# Patient Record
Sex: FEMALE | Race: WHITE | URBAN - METROPOLITAN AREA
[De-identification: names, ages, dates, MRNs, and addresses within clinical notes are randomized per-mention and may not be internally consistent; named-entity substitution may affect disease eponyms.]

---

## 2020-11-04 ENCOUNTER — VIRTUAL VISIT (OUTPATIENT)
Dept: FAMILY MEDICINE | Facility: CLINIC | Age: 70
End: 2020-11-04
Payer: COMMERCIAL

## 2020-11-04 DIAGNOSIS — B37.31 CANDIDIASIS OF VAGINA: ICD-10-CM

## 2020-11-04 DIAGNOSIS — J01.90 ACUTE SINUSITIS WITH SYMPTOMS > 10 DAYS: Primary | ICD-10-CM

## 2020-11-04 PROCEDURE — 99203 OFFICE O/P NEW LOW 30 MIN: CPT | Mod: 95 | Performed by: NURSE PRACTITIONER

## 2020-11-04 RX ORDER — CITALOPRAM HYDROBROMIDE 10 MG/1
10 TABLET ORAL DAILY
COMMUNITY
Start: 2020-09-29

## 2020-11-04 RX ORDER — LOSARTAN POTASSIUM AND HYDROCHLOROTHIAZIDE 12.5; 1 MG/1; MG/1
TABLET ORAL
COMMUNITY
Start: 2020-10-15

## 2020-11-04 RX ORDER — FLUCONAZOLE 150 MG/1
150 TABLET ORAL ONCE
Qty: 1 TABLET | Refills: 0 | Status: SHIPPED | OUTPATIENT
Start: 2020-11-04 | End: 2020-11-04

## 2020-11-04 ASSESSMENT — ENCOUNTER SYMPTOMS
COUGH: 0
NAUSEA: 0
DIARRHEA: 0
HEADACHES: 1
SINUS PRESSURE: 1
FATIGUE: 1
VOMITING: 0
SORE THROAT: 0
RHINORRHEA: 1
FEVER: 0
CHILLS: 1

## 2020-11-04 NOTE — PATIENT INSTRUCTIONS
You have a sinus infection.  1. Take antibiotics as prescribed for the full course.  2. Take a probiotic and/or eat yogurt daily while on antibiotics and ~1 week after to prevent GI upset.  3. Continue to use OTC medications for symptom relief.   4. Rest and stay hydrated.  5. Use a humidifier in the bedroom, warm compresses on the face, and steam inhalation.  6. Use Diflucan as needed if you develop a vaginal yeast infection after taking antibiotics.   7. If symptoms worsen or do not improve within 1 week, please follow-up with your PCP.

## 2020-11-04 NOTE — PROGRESS NOTES
"Virginia Ortega is a 70 year old female who is being evaluated via a billable video visit.      The patient has been notified of following:     \"This video visit will be conducted via a call between you and your physician/provider. We have found that certain health care needs can be provided without the need for an in-person physical exam.  This service lets us provide the care you need with a video conversation.  If a prescription is necessary we can send it directly to your pharmacy.  If lab work is needed we can place an order for that and you can then stop by our lab to have the test done at a later time.    Video visits are billed at different rates depending on your insurance coverage.  Please reach out to your insurance provider with any questions.    If during the course of the call the physician/provider feels a video visit is not appropriate, you will not be charged for this service.\"    Patient has given verbal consent for Video visit? Yes  How would you like to obtain your AVS? Mail a copy  If you are dropped from the video visit, the video invite should be resent to: Send to e-mail at: No e-mail address on record [email or web address] ([email or web address] email)   Will anyone else be joining your video visit? Yes: Sister . How would they like to receive their invitation? Other e-mail: [employer]    Subjective     Virginia Ortega is a 70 year old female who presents today via video visit for the following health issues:    HPI     Acute Illness  Acute illness concerns: poss sinus inf. States that her nose is sensitive - blood in her nose, teeth, cheeks, and ears are feeling pressure.   Onset/Duration x 2 weeks   Symptoms:  Fever: no  Chills/Sweats: YES- chills  Headache (location?): YES  Sinus Pressure: YES  Conjunctivitis:  YES- itchy   Ear Pain: YES: changes in ability to pop her ears  Rhinorrhea: YES  Congestion: YES in her nose and pressure in sinus'  Sore Throat: no  Cough: " no  Wheeze: no  Decreased Appetite: no  Nausea: no  Vomiting: no  Diarrhea: no  Dysuria/Freq.: no  Dysuria or Hematuria: no  Fatigue/Achiness: YES  Sick/Strep Exposure: no  Therapies tried and outcome: Nasal decongestant. Tylenol and was sticking triple antibiotic up her nose to help with the dryness.  Also reporting pressure in teeth and cheeks         Video Start Time: 1:40 PM    Additional provider notes: Sinus symptoms ongoing for 2 weeks now. Nose is bloody and sensitive. Having dental and cheek pain over the past couple of days. Having green drainage. Has had sinus infections in the past and always feels it in her teeth. No known COVID contacts. Has a history of vaginal yeast infections with antibiotics. Is flying home to [place] in 5 days, just wanting to make sure sinuses are cleared up so she doesn't get pressure in her ears.     Review of Systems   Constitutional: Positive for chills and fatigue. Negative for fever.   HENT: Positive for congestion, dental problem, ear pain, rhinorrhea and sinus pressure. Negative for sore throat.    Respiratory: Negative for cough.    Gastrointestinal: Negative for diarrhea, nausea and vomiting.   Neurological: Positive for headaches.      No Known Allergies  Current Outpatient Medications   Medication     amoxicillin-clavulanate (AUGMENTIN) 875-125 MG tablet     fluconazole (DIFLUCAN) 150 MG tablet     citalopram (CELEXA) 10 MG tablet     losartan-hydrochlorothiazide (HYZAAR) 100-12.5 MG tablet     No current facility-administered medications for this visit.      History reviewed. No pertinent past medical history.        Objective           Vitals:  No vitals were obtained today due to virtual visit.    Physical Exam     GENERAL: Healthy, alert and no distress  EYES: Eyes grossly normal to inspection.  No discharge or erythema, or obvious scleral/conjunctival abnormalities.  RESP: No audible wheeze, cough, or visible cyanosis.  No visible retractions or increased work of  breathing.    SKIN: Visible skin clear. No significant rash, abnormal pigmentation or lesions.  NEURO: Cranial nerves grossly intact.  Mentation and speech appropriate for age.  PSYCH: Mentation appears normal, affect normal/bright, judgement and insight intact, normal speech and appearance well-groomed.            Assessment & Plan     Acute sinusitis with symptoms > 10 days  Symptoms consistent with sinusitis. Augmenting prescribed. Side effects, risks and benefits of medication were discussed with patient. Discussed how and when to take medication. Recommended taking a probiotic and/or eating yogurt every day while on antibiotics and for ~1 week after stopping antibiotics to prevent GI upset. Discussed OTC recommendations for symptom control. Rest, hydration, humidification. Follow-up with PCP in [place] if symptoms are not resolved after abx treatment.     - amoxicillin-clavulanate (AUGMENTIN) 875-125 MG tablet; Take 1 tablet by mouth 2 times daily for 7 days    Candidiasis of vagina  History of vaginal candidiasis with use of abx. Diflucan prescribed to be used if symptoms develop.     - fluconazole (DIFLUCAN) 150 MG tablet; Take 1 tablet (150 mg) by mouth once for 1 dose        Patient Instructions   You have a sinus infection.  1. Take antibiotics as prescribed for the full course.  2. Take a probiotic and/or eat yogurt daily while on antibiotics and ~1 week after to prevent GI upset.  3. Continue to use OTC medications for symptom relief.   4. Rest and stay hydrated.  5. Use a humidifier in the bedroom, warm compresses on the face, and steam inhalation.  6. Use Diflucan as needed if you develop a vaginal yeast infection after taking antibiotics.   7. If symptoms worsen or do not improve within 1 week, please follow-up with your PCP.        Return if symptoms worsen or fail to improve.    GEMA Conroy [facility]      Video-Visit Details    Type of service:  Video Visit    Video  End Time:1:52 PM    Originating Location (pt. Location): Home    Distant Location (provider location):  [facility]     Platform used for Video Visit: [employer]